# Patient Record
Sex: MALE | Race: OTHER | Employment: STUDENT | URBAN - METROPOLITAN AREA
[De-identification: names, ages, dates, MRNs, and addresses within clinical notes are randomized per-mention and may not be internally consistent; named-entity substitution may affect disease eponyms.]

---

## 2017-01-27 ENCOUNTER — HOSPITAL ENCOUNTER (OUTPATIENT)
Age: 32
Discharge: HOME | End: 2017-01-27
Attending: FAMILY MEDICINE
Payer: COMMERCIAL

## 2017-01-27 ENCOUNTER — APPOINTMENT (OUTPATIENT)
Dept: GENERAL RADIOLOGY | Age: 32
End: 2017-01-27
Attending: FAMILY MEDICINE
Payer: COMMERCIAL

## 2017-01-27 VITALS
SYSTOLIC BLOOD PRESSURE: 109 MMHG | BODY MASS INDEX: 22 KG/M2 | WEIGHT: 147.69 LBS | OXYGEN SATURATION: 100 % | DIASTOLIC BLOOD PRESSURE: 77 MMHG | RESPIRATION RATE: 18 BRPM | TEMPERATURE: 97 F | HEART RATE: 67 BPM

## 2017-01-27 DIAGNOSIS — Z87.19 HISTORY OF ESOPHAGEAL REFLUX: ICD-10-CM

## 2017-01-27 DIAGNOSIS — K21.9 GASTROESOPHAGEAL REFLUX DISEASE, ESOPHAGITIS PRESENCE NOT SPECIFIED: Primary | ICD-10-CM

## 2017-01-27 DIAGNOSIS — M79.602 ARM PAIN, ANTERIOR, LEFT: ICD-10-CM

## 2017-01-27 PROCEDURE — 99214 OFFICE O/P EST MOD 30 MIN: CPT

## 2017-01-27 PROCEDURE — 93005 ELECTROCARDIOGRAM TRACING: CPT

## 2017-01-27 PROCEDURE — 71020 XR CHEST PA + LAT CHEST (CPT=71020): CPT

## 2017-01-27 PROCEDURE — 93010 ELECTROCARDIOGRAM REPORT: CPT

## 2017-01-27 PROCEDURE — 99205 OFFICE O/P NEW HI 60 MIN: CPT

## 2017-01-27 RX ORDER — OMEPRAZOLE 20 MG/1
20 CAPSULE, DELAYED RELEASE ORAL DAILY
Qty: 30 CAPSULE | Refills: 0 | Status: SHIPPED | OUTPATIENT
Start: 2017-01-27 | End: 2017-01-30

## 2017-01-27 RX ORDER — ACETAMINOPHEN 500 MG
1000 TABLET ORAL ONCE
Status: COMPLETED | OUTPATIENT
Start: 2017-01-27 | End: 2017-01-27

## 2017-01-27 RX ORDER — FAMOTIDINE 20 MG/1
20 TABLET ORAL ONCE
Status: COMPLETED | OUTPATIENT
Start: 2017-01-27 | End: 2017-01-27

## 2017-01-28 NOTE — ED INITIAL ASSESSMENT (HPI)
CP radiating to L shoulder starting last night. Reports starting 1 hour after drinking 1/2 cup coffee. \"I don't breath smoothly. \"  Denies recent travel.

## 2017-01-28 NOTE — ED PROVIDER NOTES
Patient Seen in: [facility] [place] [facility] [place] [facility]    History   Patient presents with:  Chest Pain Angina (cardiovascular)    Stated Complaint: Chest pain    HPI  19-year-old male coming in with intermittent episodes of sharp chest pain noted over the One tab daily 30 minutes before breakfast   loratadine 10 MG Oral Tab,  Take 1 tablet (10 mg total) by mouth daily. Fluticasone Propionate 50 MCG/ACT Nasal Suspension,  2 sprays by Each Nare route daily.     Family history was reviewed, and it is noncontr ST-T wave changes. Orders Placed This Encounter  XR CHEST PA + LAT CHEST (OHO=[id number]) Once  Order Comments:   Chest pain CP radiating to L shoulder starting last night. Reports starting 1 hour    after drinking 1/2 cup coffee.    \"I don't renan lifting or pushing or pulling     Follow up with PMD in the next 3-5 days for reassessment of symptoms       Disposition and Plan     Clinical Impression:  Gastroesophageal reflux disease, esophagitis presence not specified  (primary encounter diagnosis)

## 2017-01-30 ENCOUNTER — OFFICE VISIT (OUTPATIENT)
Dept: FAMILY MEDICINE CLINIC | Facility: CLINIC | Age: 32
End: 2017-01-30

## 2017-01-30 VITALS
HEIGHT: 68 IN | BODY MASS INDEX: 21.98 KG/M2 | RESPIRATION RATE: 16 BRPM | HEART RATE: 68 BPM | WEIGHT: 145 LBS | SYSTOLIC BLOOD PRESSURE: 108 MMHG | DIASTOLIC BLOOD PRESSURE: 66 MMHG

## 2017-01-30 DIAGNOSIS — M25.512 ACUTE PAIN OF LEFT SHOULDER: ICD-10-CM

## 2017-01-30 DIAGNOSIS — R07.89 OTHER CHEST PAIN: Primary | ICD-10-CM

## 2017-01-30 PROCEDURE — 99214 OFFICE O/P EST MOD 30 MIN: CPT | Performed by: FAMILY MEDICINE

## 2017-01-30 RX ORDER — NICOTINE POLACRILEX 4 MG/1
GUM, CHEWING ORAL
Qty: 30 TABLET | Refills: 2 | Status: SHIPPED | OUTPATIENT
Start: 2017-01-30 | End: 2017-02-08

## 2017-01-30 NOTE — PROGRESS NOTES
Krystyna Wheeler is a 36year old male here for Patient presents with:  Urgent Care F/u: The patient was at Urgent care on 01/27/2017 for constant chest pain.        HPI:     Chest Pain  -was seen 3 days ago at urgent care for chest pain  -has history of component as well  -history does not fit with cardiac etiology, and less likely with neg stress test one year ago  -start omeprazole 40mg bid for next 1-2 wks  -avoid trigger foods  -start neck and shoulder exercises per handout  -f/u in 1-2 months, sooner

## 2017-02-03 ENCOUNTER — HOSPITAL ENCOUNTER (OUTPATIENT)
Dept: GENERAL RADIOLOGY | Age: 32
Discharge: HOME | End: 2017-02-03
Attending: FAMILY MEDICINE
Payer: COMMERCIAL

## 2017-02-03 DIAGNOSIS — M25.512 ACUTE PAIN OF LEFT SHOULDER: ICD-10-CM

## 2017-02-03 PROCEDURE — 73030 X-RAY EXAM OF SHOULDER: CPT

## 2017-02-06 ENCOUNTER — TELEPHONE (OUTPATIENT)
Dept: FAMILY MEDICINE CLINIC | Facility: CLINIC | Age: 32
End: 2017-02-06

## 2017-02-06 NOTE — TELEPHONE ENCOUNTER
----- Message from Sadie Moreau MD sent at 2/6/2017 12:20 PM CST -----  Xray of shoulders is normal  -followup as planned

## 2017-02-08 ENCOUNTER — TELEPHONE (OUTPATIENT)
Dept: FAMILY MEDICINE CLINIC | Facility: CLINIC | Age: 32
End: 2017-02-08

## 2017-02-08 RX ORDER — NICOTINE POLACRILEX 4 MG/1
GUM, CHEWING ORAL
Qty: 30 TABLET | Refills: 1 | Status: SHIPPED | OUTPATIENT
Start: 2017-02-08 | End: 2017-02-15

## 2017-02-08 NOTE — TELEPHONE ENCOUNTER
Pt said that he is suppose to be taking this med 2 times daily. Pharmacy said they only show once daily.

## 2017-02-08 NOTE — TELEPHONE ENCOUNTER
This medication will not be covered if pt takes two per day. lmom for pt regarding this. Asked pt after reviewing message to call office with any questions.

## 2017-02-08 NOTE — TELEPHONE ENCOUNTER
This med was sent on 1/30/17 for 30 with 2 refills to a different [facility] than the one pt is requesting so refill re-submitted to the [facility] per pt request

## 2017-02-15 ENCOUNTER — OFFICE VISIT (OUTPATIENT)
Dept: FAMILY MEDICINE CLINIC | Facility: CLINIC | Age: 32
End: 2017-02-15

## 2017-02-15 VITALS
SYSTOLIC BLOOD PRESSURE: 110 MMHG | RESPIRATION RATE: 16 BRPM | BODY MASS INDEX: 22.73 KG/M2 | DIASTOLIC BLOOD PRESSURE: 60 MMHG | HEIGHT: 68 IN | HEART RATE: 76 BPM | WEIGHT: 150 LBS

## 2017-02-15 DIAGNOSIS — R07.89 OTHER CHEST PAIN: ICD-10-CM

## 2017-02-15 DIAGNOSIS — K21.9 GASTROESOPHAGEAL REFLUX DISEASE WITHOUT ESOPHAGITIS: Primary | ICD-10-CM

## 2017-02-15 PROCEDURE — 99214 OFFICE O/P EST MOD 30 MIN: CPT | Performed by: FAMILY MEDICINE

## 2017-02-15 RX ORDER — OMEPRAZOLE 40 MG/1
40 CAPSULE, DELAYED RELEASE ORAL DAILY
Qty: 30 CAPSULE | Refills: 2 | Status: SHIPPED | OUTPATIENT
Start: 2017-02-15 | End: 2017-08-18

## 2017-02-15 NOTE — PATIENT INSTRUCTIONS
--continue omeprazole 40mg every morning for 2 wks, then stop  --if symptoms come back, restart the medication until you see Dr. Antonina Gresham at [facility]  --if you want a sooner appt, call me and I can make a referral

## 2017-02-15 NOTE — PROGRESS NOTES
Ariel Brice is a 36year old male here for Patient presents with:   Follow - Up: on & off chest pain, the patient states that it is much better      HPI:     GERD/Atypical chest pain  -started taking omeprazole 40mg   -since doing that, his pain has Prescriptions Disp Refills    Omeprazole 40 MG Oral Capsule Delayed Release 30 capsule 2      Sig: Take 1 capsule (40 mg total) by mouth daily.          2/15/2017  Dana Godinez MD

## 2017-02-22 ENCOUNTER — APPOINTMENT (OUTPATIENT)
Dept: CV DIAGNOSTICS | Facility: HOSPITAL | Age: 32
End: 2017-02-22
Attending: EMERGENCY MEDICINE
Payer: COMMERCIAL

## 2017-02-22 ENCOUNTER — HOSPITAL ENCOUNTER (EMERGENCY)
Facility: HOSPITAL | Age: 32
Discharge: HOME | End: 2017-02-22
Attending: EMERGENCY MEDICINE
Payer: COMMERCIAL

## 2017-02-22 ENCOUNTER — APPOINTMENT (OUTPATIENT)
Dept: GENERAL RADIOLOGY | Facility: HOSPITAL | Age: 32
End: 2017-02-22
Attending: EMERGENCY MEDICINE
Payer: COMMERCIAL

## 2017-02-22 VITALS
SYSTOLIC BLOOD PRESSURE: 112 MMHG | WEIGHT: 150 LBS | HEART RATE: 78 BPM | OXYGEN SATURATION: 98 % | RESPIRATION RATE: 16 BRPM | BODY MASS INDEX: 23.54 KG/M2 | HEIGHT: 66.93 IN | DIASTOLIC BLOOD PRESSURE: 70 MMHG | TEMPERATURE: 98 F

## 2017-02-22 DIAGNOSIS — K21.00 GASTROESOPHAGEAL REFLUX DISEASE WITH ESOPHAGITIS: ICD-10-CM

## 2017-02-22 DIAGNOSIS — R07.89 CHEST PAIN, ATYPICAL: Primary | ICD-10-CM

## 2017-02-22 LAB
ALBUMIN SERPL-MCNC: 4 G/DL (ref 3.5–4.8)
ALP LIVER SERPL-CCNC: 74 U/L (ref 45–117)
ALT SERPL-CCNC: 23 U/L (ref 17–63)
AST SERPL-CCNC: 14 U/L (ref 15–41)
ATRIAL RATE: 78 BPM
BASOPHILS # BLD AUTO: 0.04 X10(3) UL (ref 0–0.1)
BASOPHILS NFR BLD AUTO: 0.7 %
BILIRUB SERPL-MCNC: 0.4 MG/DL (ref 0.1–2)
BUN BLD-MCNC: 12 MG/DL (ref 8–20)
CALCIUM BLD-MCNC: 9.2 MG/DL (ref 8.3–10.3)
CHLORIDE: 105 MMOL/L (ref 101–111)
CO2: 28 MMOL/L (ref 22–32)
CREAT BLD-MCNC: 0.88 MG/DL (ref 0.7–1.3)
D-DIMER: 0.27 UG/ML FEU (ref 0–0.49)
EOSINOPHIL # BLD AUTO: 0.17 X10(3) UL (ref 0–0.3)
EOSINOPHIL NFR BLD AUTO: 3.1 %
ERYTHROCYTE [DISTWIDTH] IN BLOOD BY AUTOMATED COUNT: 11.6 % (ref 11.5–16)
GLUCOSE BLD-MCNC: 134 MG/DL (ref 70–99)
HCT VFR BLD AUTO: 41.5 % (ref 37–53)
HGB BLD-MCNC: 14.1 G/DL (ref 13–17)
IMMATURE GRANULOCYTE COUNT: 0.02 X10(3) UL (ref 0–1)
IMMATURE GRANULOCYTE RATIO %: 0.4 %
LYMPHOCYTES # BLD AUTO: 2.03 X10(3) UL (ref 0.9–4)
LYMPHOCYTES NFR BLD AUTO: 36.6 %
M PROTEIN MFR SERPL ELPH: 7.6 G/DL (ref 6.1–8.3)
MCH RBC QN AUTO: 30.1 PG (ref 27–33.2)
MCHC RBC AUTO-ENTMCNC: 34 G/DL (ref 31–37)
MCV RBC AUTO: 88.5 FL (ref 80–99)
MONOCYTES # BLD AUTO: 0.35 X10(3) UL (ref 0.1–0.6)
MONOCYTES NFR BLD AUTO: 6.3 %
NEUTROPHIL ABS PRELIM: 2.93 X10 (3) UL (ref 1.3–6.7)
NEUTROPHILS # BLD AUTO: 2.93 X10(3) UL (ref 1.3–6.7)
NEUTROPHILS NFR BLD AUTO: 52.9 %
P AXIS: 47 DEGREES
P-R INTERVAL: 168 MS
PLATELET # BLD AUTO: 218 10(3)UL (ref 150–450)
POTASSIUM SERPL-SCNC: 3.8 MMOL/L (ref 3.6–5.1)
Q-T INTERVAL: 348 MS
QRS DURATION: 86 MS
QTC CALCULATION (BEZET): 396 MS
R AXIS: 60 DEGREES
RBC # BLD AUTO: 4.69 X10(6)UL (ref 4.3–5.7)
RED CELL DISTRIBUTION WIDTH-SD: 37 FL (ref 35.1–46.3)
SED RATE-ML: 9 MM/HR (ref 0–12)
SODIUM SERPL-SCNC: 138 MMOL/L (ref 136–144)
T AXIS: 62 DEGREES
TROPONIN: <0.046 NG/ML (ref ?–0.05)
VENTRICULAR RATE: 78 BPM
WBC # BLD AUTO: 5.5 X10(3) UL (ref 4–13)

## 2017-02-22 PROCEDURE — C9113 INJ PANTOPRAZOLE SODIUM, VIA: HCPCS | Performed by: EMERGENCY MEDICINE

## 2017-02-22 PROCEDURE — 93350 STRESS TTE ONLY: CPT

## 2017-02-22 PROCEDURE — 85025 COMPLETE CBC W/AUTO DIFF WBC: CPT | Performed by: EMERGENCY MEDICINE

## 2017-02-22 PROCEDURE — 99285 EMERGENCY DEPT VISIT HI MDM: CPT

## 2017-02-22 PROCEDURE — 96375 TX/PRO/DX INJ NEW DRUG ADDON: CPT

## 2017-02-22 PROCEDURE — 93018 CV STRESS TEST I&R ONLY: CPT | Performed by: INTERNAL MEDICINE

## 2017-02-22 PROCEDURE — 93010 ELECTROCARDIOGRAM REPORT: CPT

## 2017-02-22 PROCEDURE — 85378 FIBRIN DEGRADE SEMIQUANT: CPT | Performed by: EMERGENCY MEDICINE

## 2017-02-22 PROCEDURE — 93350 STRESS TTE ONLY: CPT | Performed by: INTERNAL MEDICINE

## 2017-02-22 PROCEDURE — 93017 CV STRESS TEST TRACING ONLY: CPT

## 2017-02-22 PROCEDURE — 71020 XR CHEST PA + LAT CHEST (CPT=71020): CPT

## 2017-02-22 PROCEDURE — 96374 THER/PROPH/DIAG INJ IV PUSH: CPT

## 2017-02-22 PROCEDURE — 84484 ASSAY OF TROPONIN QUANT: CPT | Performed by: EMERGENCY MEDICINE

## 2017-02-22 PROCEDURE — 80053 COMPREHEN METABOLIC PANEL: CPT | Performed by: EMERGENCY MEDICINE

## 2017-02-22 PROCEDURE — 93005 ELECTROCARDIOGRAM TRACING: CPT

## 2017-02-22 PROCEDURE — 85652 RBC SED RATE AUTOMATED: CPT | Performed by: EMERGENCY MEDICINE

## 2017-02-22 RX ORDER — ASPIRIN 81 MG/1
324 TABLET, CHEWABLE ORAL ONCE
Status: COMPLETED | OUTPATIENT
Start: 2017-02-22 | End: 2017-02-22

## 2017-02-22 RX ORDER — PANTOPRAZOLE SODIUM 40 MG/1
40 TABLET, DELAYED RELEASE ORAL DAILY
Qty: 30 TABLET | Refills: 0 | Status: SHIPPED | OUTPATIENT
Start: 2017-02-22 | End: 2017-03-24

## 2017-02-22 RX ORDER — KETOROLAC TROMETHAMINE 30 MG/ML
30 INJECTION, SOLUTION INTRAMUSCULAR; INTRAVENOUS ONCE
Status: COMPLETED | OUTPATIENT
Start: 2017-02-22 | End: 2017-02-22

## 2017-02-22 RX ORDER — SUCRALFATE 1 G/1
1 TABLET ORAL
Qty: 40 TABLET | Refills: 0 | Status: SHIPPED | OUTPATIENT
Start: 2017-02-22 | End: 2017-08-18

## 2017-02-22 NOTE — ED NOTES
Patient remains updated with plan of care, waiting for stress test at this time. Cardiographics aware of patient and will send for him shortly.

## 2017-02-22 NOTE — PROGRESS NOTES
PRELIMINARY STRESS ECHO EKG NOTE  Pt exercised 13:00 on a cathy protocol stopping d/t fatigue; denied cardiac symptoms, no ekg changes. Max  (93%); max /68. Echo pending.

## 2017-02-22 NOTE — ED NOTES
MD at bedside at this time to update patient with results and plan of care. Waiting for stress test results at this time.

## 2017-02-22 NOTE — ED PROVIDER NOTES
Patient Seen in: [facility] Emergency Department    History   Patient presents with:  Chest Pain Angina (cardiovascular)    Stated Complaint: chest pain    HPI    Patient is a 28-year-old male who presents emergency room with a history of some left- week       Comment: occ      Review of Systems    Positive for stated complaint: chest pain  Other systems are as noted in HPI. Constitutional and vital signs reviewed. All other systems reviewed and negative except as noted above.     PSFH elements r and is answering all questions appropriately.           ED Course     Labs Reviewed   COMP METABOLIC PANEL (14) - Abnormal; Notable for the following:     Glucose 134 (*)     AST 14 (*)     All other components within normal limits   TROPONIN I - Normal   S IV fluids in the emergency room and went for a stress echocardiogram.  Patient states he was seen by his primary care doctor for these symptoms last month who wanted to get a stress test done and a stress test was done which was found to be normal.  Delfina

## 2017-02-22 NOTE — ED INITIAL ASSESSMENT (HPI)
Patient reports left sided chest pain radiating into left arm for past 3 days, constant in nature. Admits to some phlegm in throat but denies cough/congestion/fever.  Admits to mild shortness of breath

## 2017-05-23 ENCOUNTER — APPOINTMENT (OUTPATIENT)
Dept: CT IMAGING | Age: 32
End: 2017-05-23
Attending: PHYSICIAN ASSISTANT
Payer: COMMERCIAL

## 2017-05-23 ENCOUNTER — HOSPITAL ENCOUNTER (OUTPATIENT)
Age: 32
Discharge: HOME | End: 2017-05-23
Payer: COMMERCIAL

## 2017-05-23 ENCOUNTER — TELEPHONE (OUTPATIENT)
Dept: FAMILY MEDICINE CLINIC | Facility: CLINIC | Age: 32
End: 2017-05-23

## 2017-05-23 VITALS
BODY MASS INDEX: 26.06 KG/M2 | HEIGHT: 67.72 IN | RESPIRATION RATE: 18 BRPM | TEMPERATURE: 98 F | HEART RATE: 63 BPM | WEIGHT: 170 LBS | DIASTOLIC BLOOD PRESSURE: 71 MMHG | SYSTOLIC BLOOD PRESSURE: 109 MMHG | OXYGEN SATURATION: 100 %

## 2017-05-23 DIAGNOSIS — R10.30 LOWER ABDOMINAL PAIN: Primary | ICD-10-CM

## 2017-05-23 PROCEDURE — 85025 COMPLETE CBC W/AUTO DIFF WBC: CPT | Performed by: PHYSICIAN ASSISTANT

## 2017-05-23 PROCEDURE — 99214 OFFICE O/P EST MOD 30 MIN: CPT

## 2017-05-23 PROCEDURE — 74176 CT ABD & PELVIS W/O CONTRAST: CPT | Performed by: PHYSICIAN ASSISTANT

## 2017-05-23 PROCEDURE — 80047 BASIC METABLC PNL IONIZED CA: CPT

## 2017-05-23 PROCEDURE — 81002 URINALYSIS NONAUTO W/O SCOPE: CPT | Performed by: PHYSICIAN ASSISTANT

## 2017-05-23 RX ORDER — DICYCLOMINE HCL 20 MG
20 TABLET ORAL 4 TIMES DAILY PRN
Qty: 30 TABLET | Refills: 0 | Status: SHIPPED | OUTPATIENT
Start: 2017-05-23 | End: 2017-06-22

## 2017-05-23 NOTE — ED INITIAL ASSESSMENT (HPI)
Has had times 2 days   Pain stays in middle does not radiate to sides  No vomiting or diarrhea  No one else in family sick

## 2017-05-23 NOTE — ED PROVIDER NOTES
Patient Seen in: [facility]    History   Patient presents with:  Abdominal Pain    Stated Complaint: abd pain x 2 days    HPI    Patient is a 31-year-old male. Patient has a medical history of GERD.   Patient arrives for evaluat above.    PSFH elements reviewed from today and agreed except as otherwise stated in HPI.     Physical Exam       ED Triage Vitals   BP 05/23/17 1331 126/49 mmHg   Pulse 05/23/17 1331 84   Resp 05/23/17 1331 18   Temp 05/23/17 1331 98 °F (36.7 °C)   Temp sr pain x 2 days  TECHNIQUE:  Unenhanced multislice CT scanning was performed from the dome of the diaphragm to the pubic symphysis. Dose reduction techniques were used.  Dose information is transmitted to the [address] of Radiology) [facility] [address] [place] [address] normal.  CT of the abdomen and pelvis demonstrates no acute abnormalities. The exact etiology of the patient's discomfort is unclear. Possible gas pain.   We will attempt to Bentyl, diet modification and gas-x  This case was discussed with my supervising

## 2017-05-23 NOTE — ED NOTES
Pt speaks Spanish and is having trouble describing his pain type. Informed pt he could have an  but declines at this time.    States if he changes his mind or does not understand something to let us know,

## 2017-05-23 NOTE — TELEPHONE ENCOUNTER
Pt called and said he has stomach issues and he would like to come in today with Dr. Manya Mcardle but there is nothing available until Friday.

## 2017-07-03 ENCOUNTER — APPOINTMENT (OUTPATIENT)
Dept: CT IMAGING | Age: 32
End: 2017-07-03
Attending: PHYSICIAN ASSISTANT
Payer: COMMERCIAL

## 2017-07-03 ENCOUNTER — HOSPITAL ENCOUNTER (OUTPATIENT)
Age: 32
Discharge: HOME | End: 2017-07-03
Payer: COMMERCIAL

## 2017-07-03 VITALS
SYSTOLIC BLOOD PRESSURE: 118 MMHG | HEART RATE: 66 BPM | TEMPERATURE: 98 F | OXYGEN SATURATION: 98 % | RESPIRATION RATE: 18 BRPM | HEIGHT: 66 IN | WEIGHT: 152 LBS | BODY MASS INDEX: 24.43 KG/M2 | DIASTOLIC BLOOD PRESSURE: 59 MMHG

## 2017-07-03 DIAGNOSIS — R10.9 ABDOMINAL PAIN OF UNKNOWN ETIOLOGY: Primary | ICD-10-CM

## 2017-07-03 LAB
#LYMPHOCYTE IC: 2 X10ˆ3/UL (ref 0.9–3.2)
#MXD IC: 0.5 X10ˆ3/UL (ref 0.1–1)
#NEUTROPHIL IC: 2.5 X10ˆ3/UL (ref 1.3–6.7)
CREAT SERPL-MCNC: 0.8 MG/DL (ref 0.7–1.2)
GLUCOSE BLD-MCNC: 94 MG/DL (ref 65–99)
HCT IC: 41.5 % (ref 37–54)
HGB IC: 13.6 G/DL (ref 13–17)
ISTAT BLOOD GAS TCO2: 28 MMOL/L (ref 22–32)
ISTAT BUN: 8 MG/DL (ref 8–20)
ISTAT CHLORIDE: 100 MMOL/L (ref 101–111)
ISTAT HEMATOCRIT: 41 % (ref 37–54)
ISTAT IONIZED CALCIUM: 1.24 MMOL/L (ref 1.12–1.32)
ISTAT POTASSIUM: 4.5 MMOL/L (ref 3.6–5.1)
ISTAT SODIUM: 140 MMOL/L (ref 136–144)
LYMPHOCYTES NFR BLD AUTO: 40.9 %
MCH IC: 28.8 PG (ref 27–33.2)
MCHC IC: 32.8 G/DL (ref 31–37)
MCV IC: 87.7 FL (ref 80–99)
MIXED CELL %: 10.3 %
NEUTROPHILS NFR BLD AUTO: 48.8 %
PLT IC: 215 X10ˆ3/UL (ref 150–450)
POCT BILIRUBIN URINE: NEGATIVE
POCT BLOOD URINE: NEGATIVE
POCT GLUCOSE URINE: NEGATIVE MG/DL
POCT KETONE URINE: NEGATIVE MG/DL
POCT LEUKOCYTE ESTERASE URINE: NEGATIVE
POCT NITRITE URINE: NEGATIVE
POCT PH URINE: 8.5 (ref 5–8)
POCT PROTEIN URINE: NEGATIVE MG/DL
POCT SPECIFIC GRAVITY URINE: 1.02
POCT URINE CLARITY: CLEAR
POCT URINE COLOR: YELLOW
POCT UROBILINOGEN URINE: 0.2 MG/DL
RBC IC: 4.73 X10ˆ6/UL (ref 4.3–5.7)
WBC IC: 5 X10ˆ3/UL (ref 4–13)

## 2017-07-03 PROCEDURE — 85025 COMPLETE CBC W/AUTO DIFF WBC: CPT | Performed by: PHYSICIAN ASSISTANT

## 2017-07-03 PROCEDURE — 74176 CT ABD & PELVIS W/O CONTRAST: CPT | Performed by: PHYSICIAN ASSISTANT

## 2017-07-03 PROCEDURE — 99215 OFFICE O/P EST HI 40 MIN: CPT

## 2017-07-03 PROCEDURE — 81002 URINALYSIS NONAUTO W/O SCOPE: CPT | Performed by: PHYSICIAN ASSISTANT

## 2017-07-03 PROCEDURE — 99214 OFFICE O/P EST MOD 30 MIN: CPT

## 2017-07-03 PROCEDURE — 80047 BASIC METABLC PNL IONIZED CA: CPT

## 2017-07-03 PROCEDURE — 36415 COLL VENOUS BLD VENIPUNCTURE: CPT

## 2017-07-03 RX ORDER — DICYCLOMINE HCL 20 MG
20 TABLET ORAL 4 TIMES DAILY PRN
Qty: 30 TABLET | Refills: 0 | Status: SHIPPED | OUTPATIENT
Start: 2017-07-03 | End: 2017-07-25 | Stop reason: ALTCHOICE

## 2017-07-03 NOTE — ED PROVIDER NOTES
Patient Seen in: [facility]    History   Patient presents with:  Abdomen/Flank Pain (GI/)    Stated Complaint: stomach pain x 3 days    HPI    Patient is a 82-year-old male with a medical history of esophageal reflux.   For the 0.0 oz/week     Comment: occ      Review of Systems    Positive for stated complaint: stomach pain x 3 days  Other systems are as noted in HPI. Constitutional and vital signs reviewed. All other systems reviewed and negative except as noted above. PH, Urine 8.5 (*)     All other components within normal limits   POCT ISTAT CHEM8 CARTRIDGE - Abnormal; Notable for the following:     ISTAT Chloride 100 (*)     All other components within normal limits   POCT CBC - Normal       ======================

## 2017-07-03 NOTE — ED INITIAL ASSESSMENT (HPI)
Pt c/o abdominal pain x 3 days. States ate vegetables at lunch that were on same plate as shrimp. Thinks may be allergic to shrimp. Had similar reaction 4 years ago. Pt states 6-8 hours after eating, vomited x 1. No further nausea or vomiting.   No fev

## 2017-07-25 ENCOUNTER — OFFICE VISIT (OUTPATIENT)
Dept: FAMILY MEDICINE CLINIC | Facility: CLINIC | Age: 32
End: 2017-07-25

## 2017-07-25 VITALS
HEIGHT: 68 IN | DIASTOLIC BLOOD PRESSURE: 58 MMHG | HEART RATE: 82 BPM | SYSTOLIC BLOOD PRESSURE: 110 MMHG | BODY MASS INDEX: 22.73 KG/M2 | WEIGHT: 150 LBS

## 2017-07-25 DIAGNOSIS — R10.9 ABDOMINAL DISCOMFORT: Primary | ICD-10-CM

## 2017-07-25 DIAGNOSIS — Z00.00 ROUTINE GENERAL MEDICAL EXAMINATION AT A HEALTH CARE FACILITY: ICD-10-CM

## 2017-07-25 PROCEDURE — 99215 OFFICE O/P EST HI 40 MIN: CPT | Performed by: FAMILY MEDICINE

## 2017-07-25 RX ORDER — DICYCLOMINE HYDROCHLORIDE 10 MG/1
10 CAPSULE ORAL 4 TIMES DAILY
Qty: 40 CAPSULE | Refills: 1 | Status: SHIPPED | OUTPATIENT
Start: 2017-07-25 | End: 2017-08-04

## 2017-07-25 NOTE — PATIENT INSTRUCTIONS
-- I think you have Irritable Bowel Syndrome  -- continue omeprazole if helping  -- start dicyclomine as needed for abdominal pain, reflux or bloating or gas  --schedule fasting bloodwork at triptap and come in to repeat before you leave  -- followu

## 2017-07-26 NOTE — PROGRESS NOTES
Timo Milligan is a 36year old male here for Patient presents with:  Stomach Pain: Mid abdominal pain x 6 days       HPI:      Here to followup with umbilical abd pain 6 days ago  -was seen in urgent care  -had abd ct which was unremarkable  -prescrib oriented x 3  HEENT: NCAT, pupils equal and round  Pulm: CTAB, no wheezing  CV: RRR, no murmurs  Ext: full ROM  Psych: normal affect     ASSESSMENT/PLAN:     1.  Abdominal discomfort  -discussed potential etiologies at length  -most likely IBS given unremar

## 2017-08-01 RX ORDER — OMEPRAZOLE 20 MG/1
CAPSULE, DELAYED RELEASE ORAL
Qty: 90 CAPSULE | Refills: 0 | OUTPATIENT
Start: 2017-08-01

## 2017-08-06 ENCOUNTER — HOSPITAL ENCOUNTER (OUTPATIENT)
Age: 32
Discharge: HOME | End: 2017-08-06
Attending: FAMILY MEDICINE
Payer: COMMERCIAL

## 2017-08-06 VITALS
OXYGEN SATURATION: 98 % | BODY MASS INDEX: 23 KG/M2 | TEMPERATURE: 98 F | SYSTOLIC BLOOD PRESSURE: 114 MMHG | RESPIRATION RATE: 16 BRPM | DIASTOLIC BLOOD PRESSURE: 70 MMHG | WEIGHT: 151 LBS | HEART RATE: 65 BPM

## 2017-08-06 DIAGNOSIS — K12.2 UVULITIS: ICD-10-CM

## 2017-08-06 DIAGNOSIS — J02.9 TONSILLOPHARYNGITIS: Primary | ICD-10-CM

## 2017-08-06 LAB — POCT RAPID STREP: NEGATIVE

## 2017-08-06 PROCEDURE — 87430 STREP A AG IA: CPT | Performed by: FAMILY MEDICINE

## 2017-08-06 PROCEDURE — 99214 OFFICE O/P EST MOD 30 MIN: CPT

## 2017-08-06 PROCEDURE — 87081 CULTURE SCREEN ONLY: CPT | Performed by: FAMILY MEDICINE

## 2017-08-06 RX ORDER — AMOXICILLIN AND CLAVULANATE POTASSIUM 875; 125 MG/1; MG/1
1 TABLET, FILM COATED ORAL 2 TIMES DAILY
Qty: 20 TABLET | Refills: 0 | Status: SHIPPED | OUTPATIENT
Start: 2017-08-06 | End: 2017-08-16

## 2017-08-06 NOTE — ED PROVIDER NOTES
Patient Seen in: [facility]    History   Patient presents with:  Sore Throat    Stated Complaint: sore throat, x 5days    HPI  37 yo M coming in with complaints of pain associated with swallowing and feels like something is stuc (Oral)   Resp 16   Wt 68.5 kg   SpO2 98%   BMI 22.96 kg/m²     Physical Exam  GEN: Not in any acute distress, making good conversation, answering appropriately   SKIN: No pallor, no erythema, no cyanosis, warm and dry  Eyes: wnl, normal conjunctiva   HEAD: the antibiotic       Disposition and Plan     Clinical Impression:  Tonsillopharyngitis  (primary encounter diagnosis)  Uvulitis    Disposition:  Discharge    Follow-up:  Chase Hogan [address] [place], [place] [address] [address] [place] [address]  [phone]

## 2017-08-06 NOTE — ED INITIAL ASSESSMENT (HPI)
Patient states he feels like something is caught in his throat that makes him cough. Patient is also complaining of fatigue and possible fever. Patient states he has had symptoms for 5 days.

## 2017-08-06 NOTE — ED NOTES
Oral challenge:  Patient took 1.5 cups of water, encouraged patient to finish cup of water and to eat saltines that were given per provider order.

## 2017-08-08 ENCOUNTER — LAB ENCOUNTER (OUTPATIENT)
Dept: LAB | Age: 32
End: 2017-08-08
Attending: FAMILY MEDICINE
Payer: COMMERCIAL

## 2017-08-08 DIAGNOSIS — Z00.00 ROUTINE GENERAL MEDICAL EXAMINATION AT A HEALTH CARE FACILITY: ICD-10-CM

## 2017-08-08 LAB
25-HYDROXYVITAMIN D (TOTAL): 22.8 NG/ML (ref 30–100)
ALBUMIN SERPL-MCNC: 4.1 G/DL (ref 3.5–4.8)
ALP LIVER SERPL-CCNC: 71 U/L (ref 45–117)
ALT SERPL-CCNC: 31 U/L (ref 17–63)
AST SERPL-CCNC: 16 U/L (ref 15–41)
BILIRUB SERPL-MCNC: 0.5 MG/DL (ref 0.1–2)
BUN BLD-MCNC: 12 MG/DL (ref 8–20)
CALCIUM BLD-MCNC: 8.7 MG/DL (ref 8.3–10.3)
CHLORIDE: 107 MMOL/L (ref 101–111)
CHOLEST SMN-MCNC: 220 MG/DL (ref ?–200)
CO2: 26 MMOL/L (ref 22–32)
CREAT BLD-MCNC: 0.83 MG/DL (ref 0.7–1.3)
GLUCOSE BLD-MCNC: 83 MG/DL (ref 70–99)
HDLC SERPL-MCNC: 45 MG/DL (ref 45–?)
HDLC SERPL: 4.89 {RATIO} (ref ?–4.97)
LDLC SERPL CALC-MCNC: 150 MG/DL (ref ?–130)
LDLC SERPL-MCNC: 25 MG/DL (ref 5–40)
M PROTEIN MFR SERPL ELPH: 7.6 G/DL (ref 6.1–8.3)
NONHDLC SERPL-MCNC: 175 MG/DL (ref ?–130)
POTASSIUM SERPL-SCNC: 3.8 MMOL/L (ref 3.6–5.1)
SODIUM SERPL-SCNC: 139 MMOL/L (ref 136–144)
TRIGLYCERIDES: 123 MG/DL (ref ?–150)
TSI SER-ACNC: 3.41 MIU/ML (ref 0.35–5.5)

## 2017-08-08 PROCEDURE — 36415 COLL VENOUS BLD VENIPUNCTURE: CPT

## 2017-08-08 PROCEDURE — 82306 VITAMIN D 25 HYDROXY: CPT

## 2017-08-08 PROCEDURE — 80053 COMPREHEN METABOLIC PANEL: CPT

## 2017-08-08 PROCEDURE — 84443 ASSAY THYROID STIM HORMONE: CPT

## 2017-08-08 PROCEDURE — 80061 LIPID PANEL: CPT

## 2017-08-15 ENCOUNTER — TELEPHONE (OUTPATIENT)
Dept: FAMILY MEDICINE CLINIC | Facility: CLINIC | Age: 32
End: 2017-08-15

## 2017-08-15 NOTE — TELEPHONE ENCOUNTER
LM for pt he missed appointment with Dr. Eric Morris yesterday. Please give office a call if you need to reschedule.   \

## 2017-08-18 ENCOUNTER — OFFICE VISIT (OUTPATIENT)
Dept: FAMILY MEDICINE CLINIC | Facility: CLINIC | Age: 32
End: 2017-08-18

## 2017-08-18 VITALS
WEIGHT: 154 LBS | DIASTOLIC BLOOD PRESSURE: 64 MMHG | SYSTOLIC BLOOD PRESSURE: 102 MMHG | HEIGHT: 68 IN | BODY MASS INDEX: 23.34 KG/M2 | HEART RATE: 80 BPM

## 2017-08-18 DIAGNOSIS — L29.0 RECTAL ITCHING: ICD-10-CM

## 2017-08-18 DIAGNOSIS — Z00.00 ROUTINE GENERAL MEDICAL EXAMINATION AT A HEALTH CARE FACILITY: Primary | ICD-10-CM

## 2017-08-18 DIAGNOSIS — E55.9 VITAMIN D DEFICIENCY: ICD-10-CM

## 2017-08-18 DIAGNOSIS — R10.9 ABDOMINAL DISCOMFORT: ICD-10-CM

## 2017-08-18 PROCEDURE — 99213 OFFICE O/P EST LOW 20 MIN: CPT | Performed by: FAMILY MEDICINE

## 2017-08-18 PROCEDURE — 99395 PREV VISIT EST AGE 18-39: CPT | Performed by: FAMILY MEDICINE

## 2017-08-18 RX ORDER — ERGOCALCIFEROL 1.25 MG/1
50000 CAPSULE ORAL WEEKLY
Qty: 8 CAPSULE | Refills: 0 | Status: SHIPPED | OUTPATIENT
Start: 2017-08-18

## 2017-08-18 RX ORDER — SUCRALFATE 1 G/1
1 TABLET ORAL
Qty: 120 TABLET | Refills: 0 | Status: SHIPPED | OUTPATIENT
Start: 2017-08-18

## 2017-08-18 RX ORDER — OMEPRAZOLE 40 MG/1
40 CAPSULE, DELAYED RELEASE ORAL DAILY
Qty: 90 CAPSULE | Refills: 0 | Status: SHIPPED | OUTPATIENT
Start: 2017-08-18

## 2017-08-18 NOTE — PROGRESS NOTES
Eren Nguyen is a 36year old male who is here for Patient presents with:  Lab Results      HPI:     Followup labs - returning home to [place] next week    Rectal itching  -has had chronically, worsening over past several months  -suri Years: 12.00        Types: Cigarettes     Quit date: 8/8/2016  Smokeless tobacco: Never Used                      Alcohol use: Yes           0.0 oz/week     Comment: occ          EXAM:   /64 (BP Location: Right arm, Patient Position: Sitting, Cuff Si

## 2017-08-18 NOTE — PATIENT INSTRUCTIONS
-- start fish oil 2x/day  -- increase fish in diet  -- less red meat in diet  -- increase exercise  -- baby wipes after using bathroom  -- much less time on toilet  --if itching continues, can try OTC hydrocortisone 1% cream up to 2x/day for 1-2 weeks
